# Patient Record
Sex: MALE | Race: WHITE | NOT HISPANIC OR LATINO | Employment: UNEMPLOYED | URBAN - METROPOLITAN AREA
[De-identification: names, ages, dates, MRNs, and addresses within clinical notes are randomized per-mention and may not be internally consistent; named-entity substitution may affect disease eponyms.]

---

## 2020-10-18 ENCOUNTER — HOSPITAL ENCOUNTER (EMERGENCY)
Facility: HOSPITAL | Age: 35
Discharge: HOME | End: 2020-10-18
Attending: STUDENT IN AN ORGANIZED HEALTH CARE EDUCATION/TRAINING PROGRAM
Payer: MEDICAID

## 2020-10-18 VITALS
TEMPERATURE: 99 F | WEIGHT: 171.38 LBS | DIASTOLIC BLOOD PRESSURE: 67 MMHG | SYSTOLIC BLOOD PRESSURE: 134 MMHG | RESPIRATION RATE: 16 BRPM | HEIGHT: 68 IN | BODY MASS INDEX: 25.97 KG/M2 | OXYGEN SATURATION: 98 % | HEART RATE: 79 BPM

## 2020-10-18 DIAGNOSIS — F19.90 MISUSE OF PRESCRIPTION ONLY DRUGS: Primary | ICD-10-CM

## 2020-10-18 PROCEDURE — 99282 EMERGENCY DEPT VISIT SF MDM: CPT

## 2020-10-18 RX ORDER — POLYETHYLENE GLYCOL 3350 17 G/17G
17 POWDER, FOR SOLUTION ORAL
COMMUNITY
Start: 2020-10-01

## 2020-10-18 RX ORDER — METOPROLOL SUCCINATE 100 MG/1
100 TABLET, EXTENDED RELEASE ORAL
COMMUNITY
Start: 2020-10-05 | End: 2021-10-05

## 2020-10-18 RX ORDER — AMLODIPINE BESYLATE 5 MG/1
5 TABLET ORAL
COMMUNITY
Start: 2020-10-01 | End: 2021-10-01

## 2020-10-18 NOTE — NURSING
I entered the room to assess the pt and [facility] arrived for transport seconds thereafter. Pt AAO x 4, no s/s of distress. PICC line in tact, dressing clean and dry. Pt was provided a blue scrub shirt and socks as he did not have a shirt or shoes. The medications he had in a styrofoam cup were put in a bag and given to [facility] team member to give to [facility] staff.

## 2020-10-18 NOTE — ED PROVIDER NOTES
SCRIBE #1 NOTE: I, Libby Umanzor, am scribing for, and in the presence of, Andrew Garza MD. I have scribed the entire note.       History     Chief Complaint   Patient presents with    Drug Overdose     crack pipe found in room at rehab and percocept      Review of patient's allergies indicates:  No Known Allergies      History of Present Illness     HPI    10/18/2020, 5:12 AM  History obtained from the patient      History of Present Illness: Phill Olivo is a 35 y.o. male patient who presents to the Emergency Department for evaluation after being found with drugs in a rehab facility which onset suddenly just PTA. At [facility], per EMS, a broken crack pipe, Percocet and Toradol were found in pt's room. EMS reports that Percocet and Toradol were in an unusable form. Pt has a prescription for Percocet but pills other than his were found. Pt has no prescription for Toradol. Pt reports that he was just holding onto old Percocet and Toradol pills just in case. Pt has no complaints at this time. Patient denies any SOB, CP, n/v/d, fever, dysuria and all other sxs at this time. No prior Tx. No further complaints or concerns at this time.       Arrival mode: [facility]    PCP: Esteban Wilkes MD        Past Medical History:  History reviewed. No pertinent past medical history.       Past Surgical History:  History reviewed. No pertinent past surgical history.         Family History:  History reviewed. No pertinent family history.       Social History:  Social History     Tobacco Use    Smoking status: unknown   Substance and Sexual Activity    Alcohol use: unknown    Drug use: unknown    Sexual activity: unknown        Review of Systems     Review of Systems   Constitutional: Negative for fever.   HENT: Negative for sore throat.    Respiratory: Negative for shortness of breath.    Cardiovascular: Negative for chest pain.   Gastrointestinal: Negative for diarrhea, nausea and vomiting.   Genitourinary:  "Negative for dysuria.   Musculoskeletal: Negative for back pain.   Skin: Negative for rash.   Neurological: Negative for weakness.   Hematological: Does not bruise/bleed easily.   All other systems reviewed and are negative.       Physical Exam     Initial Vitals [10/18/20 0504]   BP Pulse Resp Temp SpO2   130/70 84 16 -- 98 %      MAP       --          Physical Exam  Nursing Notes and Vital Signs Reviewed.  Constitutional: Patient is in no acute distress. Well-developed and well-nourished.  Head: Atraumatic. Normocephalic. Pt has a visible cleft lip.  Eyes: PERRL. EOM intact. Conjunctivae are not pale. No scleral icterus.  ENT: Mucous membranes are moist. Oropharynx is clear and symmetric. Clef Lip scar noted.  Neck: Supple. Full ROM. No lymphadenopathy.  Cardiovascular: Regular rate. Regular rhythm. No murmurs, rubs, or gallops. Distal pulses are 2+ and symmetric.  Pulmonary/Chest: No respiratory distress. Clear to auscultation bilaterally. No wheezing or rales.  Abdominal: Soft and non-distended.  There is no tenderness.  No rebound, guarding, or rigidity. Good bowel sounds.  Genitourinary: No CVA tenderness  Musculoskeletal: Moves all extremities. No obvious deformities. No edema. No calf tenderness.  Skin: Warm and dry. PICC line placed in L upper extremity without erythema or other signs of infection.  Neurological:  Alert, awake, and appropriate.  Normal speech.  No acute focal neurological deficits are appreciated.  Psychiatric: Normal affect. Good eye contact. Appropriate in content.     ED Course   Procedures  ED Vital Signs:  Vitals:    10/18/20 0504   BP: 130/70   Pulse: 84   Resp: 16   SpO2: 98%   Weight: 77.7 kg (171 lb 6.4 oz)   Height: 5' 8" (1.727 m)       Abnormal Lab Results:  Labs Reviewed - No data to display     All Lab Results:  None.    Imaging Results:  Imaging Results    None                     The Emergency Provider reviewed the vital signs and test results, which are outlined above.     " ED Discussion       5:21 AM:  Discussed pt dx and plan of tx. Gave pt all f/u and return to the ED instructions. All questions and concerns were addressed at this time. Pt expresses understanding of information and instructions, and is comfortable with plan to discharge. Pt is stable for discharge.    I discussed with patient and/or family/caretaker that evaluation in the ED does not suggest any emergent or life threatening medical conditions requiring immediate intervention beyond what was provided in the ED, and I believe patient is safe for discharge.  Regardless, an unremarkable evaluation in the ED does not preclude the development or presence of a serious of life threatening condition. As such, patient was instructed to return immediately for any worsening or change in current symptoms.                   ED Medication(s):  Medications - No data to display    New Prescriptions    No medications on file       Follow-up Information     Esteban Wilkes MD. Schedule an appointment as soon as possible for a visit in 1 week.    Specialty: Family Medicine  Why: As needed  Contact information:  [address] [facility] [address]  [facility] [id number] [phone]             Go to  [facility].    Specialty: Emergency Medicine  Why: As needed, If symptoms worsen  Contact information:  [address] [facility] [address] [phone]                     Scribe Attestation:   Scribe #1: I performed the above scribed service and the documentation accurately describes the services I performed. I attest to the accuracy of the note.     Attending:   Physician Attestation Statement for Scribe #1: I, Andrew Garza MD, personally performed the services described in this documentation, as scribed by Libby Umanzor, in my presence, and it is both accurate and complete.           Clinical Impression       ICD-10-CM ICD-9-CM   1. Misuse of prescription only drugs  F19.90 305.90       Disposition:    Disposition: Discharged  Condition: Stable         Andrew Garza MD  10/18/20 0671

## 2020-10-18 NOTE — ED NOTES
Spoke with [facility] dispatch who confirms [facility] has set up transport for pt and they should be here within the hour.

## 2020-10-18 NOTE — ED NOTES
Pt sitting upright in bed, aaox4, in no acute distress, and with no complaint. Pt aware of plan to discharge back to [facility] and that he is awaiting [facility] for transportation. Will continue to monitor. Bed in low position, side rails up, call light in reach.